# Patient Record
Sex: MALE | Race: ASIAN | NOT HISPANIC OR LATINO | Employment: FULL TIME | URBAN - METROPOLITAN AREA
[De-identification: names, ages, dates, MRNs, and addresses within clinical notes are randomized per-mention and may not be internally consistent; named-entity substitution may affect disease eponyms.]

---

## 2018-02-09 ENCOUNTER — HISTORICAL (OUTPATIENT)
Dept: ADMINISTRATIVE | Facility: HOSPITAL | Age: 58
End: 2018-02-09

## 2018-02-09 LAB
ABS NEUT (OLG): 5.33 X10(3)/MCL (ref 2.1–9.2)
ALBUMIN SERPL-MCNC: 3.6 GM/DL (ref 3.4–5)
ALBUMIN/GLOB SERPL: 1.1 {RATIO}
ALP SERPL-CCNC: 75 UNIT/L (ref 50–136)
ALT SERPL-CCNC: 38 UNIT/L (ref 12–78)
APPEARANCE, UA: CLEAR
AST SERPL-CCNC: 9 UNIT/L (ref 15–37)
BACTERIA #/AREA URNS AUTO: ABNORMAL /HPF
BASOPHILS # BLD AUTO: 0.1 X10(3)/MCL (ref 0–0.2)
BASOPHILS NFR BLD AUTO: 1 %
BILIRUB SERPL-MCNC: 0.3 MG/DL (ref 0.2–1)
BILIRUB UR QL STRIP: NEGATIVE
BILIRUBIN DIRECT+TOT PNL SERPL-MCNC: 0.1 MG/DL (ref 0–0.2)
BILIRUBIN DIRECT+TOT PNL SERPL-MCNC: 0.2 MG/DL (ref 0–0.8)
BUN SERPL-MCNC: 16 MG/DL (ref 7–18)
CALCIUM SERPL-MCNC: 9 MG/DL (ref 8.5–10.1)
CHLORIDE SERPL-SCNC: 106 MMOL/L (ref 98–107)
CHOLEST SERPL-MCNC: 198 MG/DL (ref 0–200)
CHOLEST/HDLC SERPL: 4 {RATIO} (ref 0–5)
CO2 SERPL-SCNC: 27 MMOL/L (ref 21–32)
COLOR UR: YELLOW
CREAT SERPL-MCNC: 1.14 MG/DL (ref 0.7–1.3)
EOSINOPHIL # BLD AUTO: 0.3 X10(3)/MCL (ref 0–0.9)
EOSINOPHIL NFR BLD AUTO: 3 %
ERYTHROCYTE [DISTWIDTH] IN BLOOD BY AUTOMATED COUNT: 13.7 % (ref 11.5–17)
EST. AVERAGE GLUCOSE BLD GHB EST-MCNC: 123 MG/DL
GLOBULIN SER-MCNC: 3.4 GM/DL (ref 2.4–3.5)
GLUCOSE (UA): NEGATIVE
GLUCOSE SERPL-MCNC: 94 MG/DL (ref 74–106)
HBA1C MFR BLD: 5.9 % (ref 4.2–6.3)
HCT VFR BLD AUTO: 44 % (ref 42–52)
HDLC SERPL-MCNC: 49 MG/DL (ref 35–60)
HGB BLD-MCNC: 14.3 GM/DL (ref 14–18)
HGB UR QL STRIP: ABNORMAL
KETONES UR QL STRIP: NEGATIVE
LDLC SERPL CALC-MCNC: 109 MG/DL (ref 0–129)
LEUKOCYTE ESTERASE UR QL STRIP: NEGATIVE
LYMPHOCYTES # BLD AUTO: 2.2 X10(3)/MCL (ref 0.6–4.6)
LYMPHOCYTES NFR BLD AUTO: 26 %
MCH RBC QN AUTO: 26.8 PG (ref 27–31)
MCHC RBC AUTO-ENTMCNC: 32.5 GM/DL (ref 33–36)
MCV RBC AUTO: 82.4 FL (ref 80–94)
MONOCYTES # BLD AUTO: 0.6 X10(3)/MCL (ref 0.1–1.3)
MONOCYTES NFR BLD AUTO: 7 %
NEUTROPHILS # BLD AUTO: 5.33 X10(3)/MCL (ref 2.1–9.2)
NEUTROPHILS NFR BLD AUTO: 63 %
NITRITE UR QL STRIP.AUTO: NEGATIVE
PH UR STRIP: 6 [PH] (ref 5–9)
PLATELET # BLD AUTO: 232 X10(3)/MCL (ref 130–400)
PMV BLD AUTO: 10.5 FL (ref 9.4–12.4)
POTASSIUM SERPL-SCNC: 4.7 MMOL/L (ref 3.5–5.1)
PROT SERPL-MCNC: 7 GM/DL (ref 6.4–8.2)
PROT UR QL STRIP: NEGATIVE
PSA SERPL-MCNC: 7.58 NG/ML (ref 0–4)
RBC # BLD AUTO: 5.34 X10(6)/MCL (ref 4.7–6.1)
RBC #/AREA URNS HPF: ABNORMAL /[HPF]
SODIUM SERPL-SCNC: 141 MMOL/L (ref 136–145)
SP GR UR STRIP: 1.01 (ref 1–1.03)
SQUAMOUS EPITHELIAL, UA: ABNORMAL
TRIGL SERPL-MCNC: 201 MG/DL (ref 30–150)
TSH SERPL-ACNC: 3.19 MIU/ML (ref 0.36–3.74)
UROBILINOGEN UR STRIP-ACNC: 0.2
VLDLC SERPL CALC-MCNC: 40 MG/DL
WBC # SPEC AUTO: 8.5 X10(3)/MCL (ref 4.5–11.5)
WBC #/AREA URNS AUTO: ABNORMAL /HPF (ref 0–3)

## 2018-02-20 ENCOUNTER — HISTORICAL (OUTPATIENT)
Dept: ADMINISTRATIVE | Facility: HOSPITAL | Age: 58
End: 2018-02-20

## 2018-02-20 LAB
ALBUMIN SERPL-MCNC: 3.8 GM/DL (ref 3.4–5)
ALBUMIN/GLOB SERPL: 1.1 RATIO (ref 1.1–2)
ALP SERPL-CCNC: 71 UNIT/L (ref 50–136)
ALT SERPL-CCNC: 39 UNIT/L (ref 12–78)
AST SERPL-CCNC: 11 UNIT/L (ref 15–37)
BILIRUB SERPL-MCNC: 0.6 MG/DL (ref 0.2–1)
BILIRUBIN DIRECT+TOT PNL SERPL-MCNC: 0.1 MG/DL (ref 0–0.5)
BILIRUBIN DIRECT+TOT PNL SERPL-MCNC: 0.5 MG/DL (ref 0–0.8)
BUN SERPL-MCNC: 13 MG/DL (ref 7–18)
CALCIUM SERPL-MCNC: 9.6 MG/DL (ref 8.5–10.1)
CHLORIDE SERPL-SCNC: 106 MMOL/L (ref 98–107)
CO2 SERPL-SCNC: 27 MMOL/L (ref 21–32)
CREAT SERPL-MCNC: 1.1 MG/DL (ref 0.7–1.3)
ERYTHROCYTE [SEDIMENTATION RATE] IN BLOOD: 8 MM/HR (ref 0–15)
GLOBULIN SER-MCNC: 3.5 GM/DL (ref 2.4–3.5)
GLUCOSE SERPL-MCNC: 96 MG/DL (ref 74–106)
POTASSIUM SERPL-SCNC: 4.4 MMOL/L (ref 3.5–5.1)
PROT SERPL-MCNC: 7.3 GM/DL (ref 6.4–8.2)
PSA SERPL-MCNC: 6.9 NG/ML (ref 0–4)
RHEUMATOID FACT SERPL-ACNC: 10 IU/ML (ref 0–15)
SODIUM SERPL-SCNC: 140 MMOL/L (ref 136–145)

## 2018-03-20 ENCOUNTER — HISTORICAL (OUTPATIENT)
Dept: ADMINISTRATIVE | Facility: HOSPITAL | Age: 58
End: 2018-03-20

## 2018-03-20 LAB — PSA SERPL-MCNC: 5.54 NG/ML (ref 0–4)

## 2018-09-19 ENCOUNTER — HISTORICAL (OUTPATIENT)
Dept: LAB | Facility: HOSPITAL | Age: 58
End: 2018-09-19

## 2018-09-19 ENCOUNTER — HISTORICAL (OUTPATIENT)
Dept: ADMINISTRATIVE | Facility: HOSPITAL | Age: 58
End: 2018-09-19

## 2018-09-19 LAB — PSA SERPL-MCNC: 7.58 NG/ML (ref 0–4)

## 2018-09-20 LAB
APPEARANCE, UA: CLEAR
BACTERIA SPEC CULT: ABNORMAL /HPF
BILIRUB UR QL STRIP: NEGATIVE
COLOR UR: YELLOW
GLUCOSE (UA): NEGATIVE
HGB UR QL STRIP: NEGATIVE
KETONES UR QL STRIP: ABNORMAL
LEUKOCYTE ESTERASE UR QL STRIP: NEGATIVE
NITRITE UR QL STRIP: NEGATIVE
PH UR STRIP: 5.5 [PH] (ref 5–9)
PROT UR QL STRIP: NEGATIVE
RBC #/AREA URNS HPF: ABNORMAL /[HPF]
SP GR UR STRIP: 1.02 (ref 1–1.03)
SQUAMOUS EPITHELIAL, UA: ABNORMAL
UROBILINOGEN UR STRIP-ACNC: 0.2
WBC #/AREA URNS HPF: ABNORMAL /HPF

## 2018-09-28 ENCOUNTER — HISTORICAL (OUTPATIENT)
Dept: RADIOLOGY | Facility: HOSPITAL | Age: 58
End: 2018-09-28

## 2018-10-15 ENCOUNTER — HISTORICAL (OUTPATIENT)
Dept: ENDOSCOPY | Facility: HOSPITAL | Age: 58
End: 2018-10-15

## 2018-10-24 ENCOUNTER — HISTORICAL (OUTPATIENT)
Dept: ADMINISTRATIVE | Facility: HOSPITAL | Age: 58
End: 2018-10-24

## 2018-10-24 LAB
ABS NEUT (OLG): 6.09 X10(3)/MCL (ref 2.1–9.2)
ALBUMIN SERPL-MCNC: 4.1 GM/DL (ref 3.4–5)
ALBUMIN/GLOB SERPL: 1.1 {RATIO}
ALP SERPL-CCNC: 78 UNIT/L (ref 50–136)
ALT SERPL-CCNC: 44 UNIT/L (ref 12–78)
AST SERPL-CCNC: 14 UNIT/L (ref 15–37)
BASOPHILS # BLD AUTO: 0.1 X10(3)/MCL (ref 0–0.2)
BASOPHILS NFR BLD AUTO: 1 %
BILIRUB SERPL-MCNC: 0.5 MG/DL (ref 0.2–1)
BILIRUBIN DIRECT+TOT PNL SERPL-MCNC: 0.1 MG/DL (ref 0–0.2)
BILIRUBIN DIRECT+TOT PNL SERPL-MCNC: 0.4 MG/DL (ref 0–0.8)
BUN SERPL-MCNC: 17 MG/DL (ref 7–18)
CALCIUM SERPL-MCNC: 9.7 MG/DL (ref 8.5–10.1)
CHLORIDE SERPL-SCNC: 106 MMOL/L (ref 98–107)
CO2 SERPL-SCNC: 27 MMOL/L (ref 21–32)
CREAT SERPL-MCNC: 1.27 MG/DL (ref 0.7–1.3)
EOSINOPHIL # BLD AUTO: 0.3 X10(3)/MCL (ref 0–0.9)
EOSINOPHIL NFR BLD AUTO: 3 %
ERYTHROCYTE [DISTWIDTH] IN BLOOD BY AUTOMATED COUNT: 13.9 % (ref 11.5–17)
GLOBULIN SER-MCNC: 3.6 GM/DL (ref 2.4–3.5)
GLUCOSE SERPL-MCNC: 96 MG/DL (ref 74–106)
HCT VFR BLD AUTO: 46.5 % (ref 42–52)
HGB BLD-MCNC: 14.6 GM/DL (ref 14–18)
LYMPHOCYTES # BLD AUTO: 2.5 X10(3)/MCL (ref 0.6–4.6)
LYMPHOCYTES NFR BLD AUTO: 25 %
MCH RBC QN AUTO: 26.5 PG (ref 27–31)
MCHC RBC AUTO-ENTMCNC: 31.4 GM/DL (ref 33–36)
MCV RBC AUTO: 84.5 FL (ref 80–94)
MONOCYTES # BLD AUTO: 0.8 X10(3)/MCL (ref 0.1–1.3)
MONOCYTES NFR BLD AUTO: 8 %
NEUTROPHILS # BLD AUTO: 6.09 X10(3)/MCL (ref 2.1–9.2)
NEUTROPHILS NFR BLD AUTO: 62 %
PLATELET # BLD AUTO: 247 X10(3)/MCL (ref 130–400)
PMV BLD AUTO: 10.7 FL (ref 9.4–12.4)
POTASSIUM SERPL-SCNC: 4.5 MMOL/L (ref 3.5–5.1)
PROT SERPL-MCNC: 7.7 GM/DL (ref 6.4–8.2)
RBC # BLD AUTO: 5.5 X10(6)/MCL (ref 4.7–6.1)
SODIUM SERPL-SCNC: 143 MMOL/L (ref 136–145)
WBC # SPEC AUTO: 9.8 X10(3)/MCL (ref 4.5–11.5)

## 2018-12-20 ENCOUNTER — HISTORICAL (OUTPATIENT)
Dept: LAB | Facility: HOSPITAL | Age: 58
End: 2018-12-20

## 2018-12-20 LAB — PSA SERPL-MCNC: 0.01 NG/ML (ref 0–4)

## 2019-04-30 ENCOUNTER — HISTORICAL (OUTPATIENT)
Dept: ADMINISTRATIVE | Facility: HOSPITAL | Age: 59
End: 2019-04-30

## 2019-04-30 LAB — PSA SERPL-MCNC: <0.01 NG/ML (ref 0–4)

## 2019-10-11 ENCOUNTER — HISTORICAL (OUTPATIENT)
Dept: ADMINISTRATIVE | Facility: HOSPITAL | Age: 59
End: 2019-10-11

## 2019-10-11 LAB — PSA SERPL-MCNC: 0.01 NG/ML (ref 0–4)

## 2020-05-01 ENCOUNTER — HISTORICAL (OUTPATIENT)
Dept: ADMINISTRATIVE | Facility: HOSPITAL | Age: 60
End: 2020-05-01

## 2020-05-01 LAB — PSA SERPL-MCNC: <0.02 NG/ML

## 2020-12-07 ENCOUNTER — HISTORICAL (OUTPATIENT)
Dept: ADMINISTRATIVE | Facility: HOSPITAL | Age: 60
End: 2020-12-07

## 2020-12-07 LAB — PSA SERPL-MCNC: <0.02 NG/ML

## 2021-10-05 ENCOUNTER — HISTORICAL (OUTPATIENT)
Dept: ADMINISTRATIVE | Facility: HOSPITAL | Age: 61
End: 2021-10-05

## 2021-10-05 LAB
ALBUMIN SERPL-MCNC: 3.8 GM/DL (ref 3.4–4.8)
ALBUMIN/GLOB SERPL: 1.5 RATIO (ref 1.1–2)
ALP SERPL-CCNC: 76 UNIT/L (ref 40–150)
ALT SERPL-CCNC: 23 UNIT/L (ref 0–55)
AST SERPL-CCNC: 16 UNIT/L (ref 5–34)
BILIRUB SERPL-MCNC: 0.3 MG/DL
BILIRUBIN DIRECT+TOT PNL SERPL-MCNC: 0.1 MG/DL (ref 0–0.8)
BILIRUBIN DIRECT+TOT PNL SERPL-MCNC: 0.2 MG/DL (ref 0–0.5)
BUN SERPL-MCNC: 15.3 MG/DL (ref 8.4–25.7)
CALCIUM SERPL-MCNC: 8.8 MG/DL (ref 8.8–10)
CHLORIDE SERPL-SCNC: 108 MMOL/L (ref 98–107)
CHOLEST SERPL-MCNC: 202 MG/DL
CHOLEST/HDLC SERPL: 4 {RATIO} (ref 0–5)
CO2 SERPL-SCNC: 22 MMOL/L (ref 23–31)
CREAT SERPL-MCNC: 1.06 MG/DL (ref 0.73–1.18)
ERYTHROCYTE [DISTWIDTH] IN BLOOD BY AUTOMATED COUNT: 14.9 % (ref 11.5–17)
GLOBULIN SER-MCNC: 2.5 GM/DL (ref 2.4–3.5)
GLUCOSE SERPL-MCNC: 93 MG/DL (ref 82–115)
HCT VFR BLD AUTO: 40.6 % (ref 42–52)
HDLC SERPL-MCNC: 48 MG/DL (ref 35–60)
HGB BLD-MCNC: 12.7 GM/DL (ref 14–18)
LDLC SERPL CALC-MCNC: 126 MG/DL (ref 50–140)
MCH RBC QN AUTO: 25.7 PG (ref 27–31)
MCHC RBC AUTO-ENTMCNC: 31.3 GM/DL (ref 33–36)
MCV RBC AUTO: 82.2 FL (ref 80–94)
PLATELET # BLD AUTO: 245 X10(3)/MCL (ref 130–400)
PMV BLD AUTO: 10.9 FL (ref 9.4–12.4)
POTASSIUM SERPL-SCNC: 4.2 MMOL/L (ref 3.5–5.1)
PROT SERPL-MCNC: 6.3 GM/DL (ref 5.8–7.6)
PSA SERPL-MCNC: <0.1 NG/ML
RBC # BLD AUTO: 4.94 X10(6)/MCL (ref 4.7–6.1)
SODIUM SERPL-SCNC: 140 MMOL/L (ref 136–145)
TRIGL SERPL-MCNC: 141 MG/DL (ref 34–140)
VLDLC SERPL CALC-MCNC: 28 MG/DL
WBC # SPEC AUTO: 8.2 X10(3)/MCL (ref 4.5–11.5)

## 2021-10-06 ENCOUNTER — HISTORICAL (OUTPATIENT)
Dept: ADMINISTRATIVE | Facility: HOSPITAL | Age: 61
End: 2021-10-06

## 2021-10-06 LAB
FERRITIN SERPL-MCNC: 24.98 NG/ML (ref 21.81–274.66)
IRON SATN MFR SERPL: 20 % (ref 20–50)
IRON SERPL-MCNC: 70 UG/DL (ref 65–175)
TIBC SERPL-MCNC: 274 UG/DL (ref 69–240)
TIBC SERPL-MCNC: 344 UG/DL (ref 250–450)
TRANSFERRIN SERPL-MCNC: 306 MG/DL (ref 174–364)
TSH SERPL-ACNC: 2.3 UIU/ML (ref 0.35–4.94)

## 2021-10-18 ENCOUNTER — HISTORICAL (OUTPATIENT)
Dept: ENDOSCOPY | Facility: HOSPITAL | Age: 61
End: 2021-10-18

## 2022-01-26 ENCOUNTER — HISTORICAL (OUTPATIENT)
Dept: ANESTHESIOLOGY | Facility: HOSPITAL | Age: 62
End: 2022-01-26

## 2022-04-30 NOTE — OP NOTE
DATE OF SURGERY:    10/18/2021    SURGEON:  Mikey Colon MD    REPORT TYPE:  Operative Report and Discharge Summary    PROCEDURES:  Colonoscopy, terminal ileoscopy, snare polypectomy.    PREOPERATIVE DIAGNOSIS:  Personal history of adenomatous colon polyps, mild anemia.    POSTOPERATIVE DIAGNOSES:    1. Adequate sedation with Diprivan per Anesthesia.  2. Normal digital rectal exam.  3. Good colon prep with successful exam to the cecum and a normal appearing terminal ileum.  4. Very rare diverticula, diverticula are 2 noted in the right colon as well.  5. A 4 or 5 mm polyp in the transverse colon at 65 cm, cold snare histopathology pending.  6. A 5 or 6 mm polyp in the ascending colon addressed with a cold snare in a piecemeal fashion and submitted.  7. Otherwise, benign exam.  Appendiceal orifice and ileocecal valve noted.  Terminal ileum normal in appearance.  8. Slow withdrawal found no other discrete mucosal abnormalities.    OPERATION:  Colonoscopy.    PROCEDURE IN DETAIL:  The patient consented for the procedure after a detailed explanation of the risks and complications including bleeding, perforation and adverse reaction to sedation.  The patient was placed in the left lateral decubitus position.  Initially we examined the perineum and performed a digital exam.     The video colonoscope was inserted in the rectal vault and passed under direct vision.  We retroflexed to allow visualization of the anal verge and then de-retroflexed, traversing the sigmoid colon, descending colon and splenic flexure, transverse colon, hepatic flexure, ascending colon and entering the cecum.  The cecum was identified by visualizing the appendiceal orifice and the ileocecal valve.  The scope was withdrawn with reexamination of the mucosa and with biopsies, polypectomies and specimens removed as outlined below.     The scope was removed in its entirety and the patient tolerated the procedure well.     The patient's  findings are as detailed.  He had a good prep, very few diverticula including a couple in the right colon, and a successful exam to the terminal ileum.  Two small polyps were addressed as noted.    DISCUSSION AND DISCHARGE SUMMARY:  After his procedure was completed, we discussed our findings with Dr. Boykin.  We examined him and found him stable.  We allowed that he could resume his usual diet today and activities tomorrow.     Perhaps the baby aspirin was the culprit.  His colon seems reassuring and current recommendations would suggest a repeat exam in 5 years.     If he has more in the way of anemia, then even without dyspeptic issues, an upper exam might be entertained.     He will touch base with us down the line.  He will call if he has not heard today's biopsy results within the week.        ______________________________  Mikey Colon MD    JCB/UK  DD:  10/18/2021  Time:  12:26PM  DT:  10/18/2021  Time:  12:55PM  Job #:  [id number]    cc: MD Gumaro De León MD

## 2022-04-30 NOTE — H&P
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  He carries a personal history of prostate cancer with previous surgery in that regard.  We saw him for screening colonoscopy in October of 2018, and encountered a sigmoid polyp, a descending colon polyp, and 4 small polyps in the ascending colon.  Histopathology returned tubular adenomas in the ascending colon, the sigmoid lesion, a submucosal leiomyoma.  We had proposed a repeat exam in 3 years.     He is today scheduled for that.  He makes note of the fact that he has had a mild diminution in his hemoglobin of late, from 14.7 to 12.6.  In retrospect, he takes a daily aspirin, though without a clear indication.  He has subsequently discontinued the aspirin.  He denies any visible blood loss and any dyspeptic issues.    ALLERGIES:  None.    MEDICATIONS:  Limited to amlodipine and that previous 81 mg aspirin.    PAST MEDICAL HISTORY:  Remarkable for hypertension and the prostate cancer surgery.    SOCIAL HISTORY:   with healthy children.  He does not smoke and does not drink.    FAMILY HISTORY:  Noncontributory.    PHYSICAL EXAM:  VITAL SIGNS:  Afebrile.  Blood pressure 151/87, pulse 78, respirations 17.   HEART:  Regular rate and rhythm.   LUNGS:  Clear to auscultation.   ABDOMEN:  Soft and nontender without discernible organomegaly or mass.   NEUROLOGIC:  Alert and oriented.  Motor grossly intact.      DISCUSSION:  61-year-old with a personal history of adenomatous colon polyps scheduled for surveillance.  Mild anemia.        ______________________________  MD CHUCK De León/UK  DD:  10/18/2021  Time:  12:26PM  DT:  10/18/2021  Time:  12:48PM  Job #:  [id number]    The H&P was reviewed, the patient was examined, and the following changes to the patients condition are  noted:  ______________________________________________________________________________  ______________________________________________________________________________  ______________________________________________________________________________  [  ] No changes to the patient's condition:      ______________________________                                             ___________________  PHYSICIAN SIGNATURE                                                             DATE/TIME    cc: MD Gumaro De León MD

## 2022-04-30 NOTE — OP NOTE
DATE OF SURGERY:    10/15/2018    SURGEON:  Mikey Colon MD    PROCEDURE:  Colonoscopy, terminal ileoscopy, snare and biopsy polypectomy.    PREOPERATIVE DIAGNOSIS:  Screening colonoscopy, personal history of prostate cancer.    POSTOPERATIVE DIAGNOSES:    1. Adequate sedation with Diprivan per Anesthesia.  2. Good colon preparation with successful examination to a normal terminal ileum.  3. 5 mm polyp at 25 cm in the sigmoid colon addressed with cold snare, histopathology pending.  4. 4 mm polyp at 50 cm in the descending colon addressed with cold snare, histopathology pending.  5. Four polyps in the ascending colon measuring 3 and 4 mm addressed with cold snare and biopsy forceps and submitted.    OPERATION:  Colonoscopy.    PROCEDURE IN DETAIL:  The patient consented for the procedure after a detailed explanation of the risks and complications including bleeding, perforation and adverse reaction to sedation.  The patient was placed in the left lateral decubitus position.  Initially we examined the perineum and performed a digital exam.     The video colonoscope was inserted in the rectal vault and passed under direct vision.  We retroflexed to allow visualization of the anal verge and then de-retroflexed, traversing the sigmoid colon, descending colon and splenic flexure, transverse colon, hepatic flexure, ascending colon and entering the cecum.  The cecum was identified by visualizing the appendiceal orifice and the ileocecal valve.  The scope was withdrawn with reexamination of the mucosa and with biopsies, polypectomies and specimens removed as outlined below.     The scope was removed in its entirety and the patient tolerated the procedure well.    FINDINGS:  The patient's findings are as detailed.  He sedated nicely and had a good preparation.  He had a straightforward examination to the cecum and into a normal-appearing terminal ileum.  We noted the appendiceal orifice and ileocecal valve.  On  slow withdrawal, we encountered polyps, 4 diminutive polyps in the ascending colon addressed with cold snare and forceps, and another polyp at 50 cm in the descending colon and an additional polyp at 25 cm in the sigmoid colon.  These were addressed as noted and submitted.    DISCUSSION AND DISCHARGE SUMMARY:  After his procedure was completed we discussed our findings with the patient and his wife.  We examined him and found him stable for discharge.  We allowed that he could resume his usual diet today and activities tomorrow.  He is to call if he has not heard his biopsies within the next week.  If these polyps are adenomatous, he ought to consider a repeat exam in 3-5 years.  Otherwise, he could wait 10 years.        ______________________________  MD CHUCK De León/SR  DD:  10/15/2018  Time:  08:24AM  DT:  10/15/2018  Time:  09:03AM  Job #:  [id number]    cc: MD Gumaro Dejesus MD

## 2022-04-30 NOTE — H&P
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  He denies gastrointestinal symptomatology.  He was recently found to have an elevated PSA, prompting biopsies positive for prostate cancer and he is contemplating treatment in that regard.    PAST MEDICAL HISTORY:  His only past medical history is remarkable for hypertension, well controlled with amlodipine.  He has no surgical.    SOCIAL HISTORY:  Finds that he is  with 3 children.  He does not smoke and really does not drink.  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    FAMILY HISTORY:  Essentially negative for colon, breast, uterine or ovarian malignancy.    PHYSICAL EXAMINATION:  VITAL SIGNS:  Find him afebrile with stable vital signs.     HEART:  Regular in rate and rhythm.     LUNGS:  Clear to auscultation.     ABDOMEN:  Soft and nontender, without discernible organomegaly or mass.     NEUROLOGICAL:  Alert and oriented.  Motor grossly intact.      DISCUSSION:  A 58-year-old newly diagnosed with prostate cancer, here for his initial screening colonoscopy.  It certainly seems prudent to proceed with this at this point in time.  Further recommendations will follow today's study.        ______________________________  MD CHUCK De León/SERENA  DD:  10/15/2018  Time:  07:41AM  DT:  10/15/2018  Time:  07:56AM  Job #:  [id number]    The H&P was reviewed, the patient was examined, and the following changes to the patients condition are noted:  ______________________________________________________________________________  ______________________________________________________________________________  ______________________________________________________________________________  [  ] No changes to the patient's condition:      ______________________________                                             ___________________  PHYSICIAN SIGNATURE                                                              DATE/TIME    cc: MD Gumaro Majano MD

## 2022-10-05 ENCOUNTER — LAB VISIT (OUTPATIENT)
Dept: LAB | Facility: HOSPITAL | Age: 62
End: 2022-10-05
Attending: RADIOLOGY
Payer: COMMERCIAL

## 2022-10-05 DIAGNOSIS — Z12.5 PROSTATE CANCER SCREENING: Primary | ICD-10-CM

## 2022-10-05 LAB — PSA SERPL-MCNC: <0.1 NG/ML

## 2022-10-05 PROCEDURE — 36415 COLL VENOUS BLD VENIPUNCTURE: CPT

## 2022-10-05 PROCEDURE — 84153 ASSAY OF PSA TOTAL: CPT

## 2024-10-31 ENCOUNTER — LAB VISIT (OUTPATIENT)
Dept: LAB | Facility: HOSPITAL | Age: 64
End: 2024-10-31
Attending: RADIOLOGY
Payer: COMMERCIAL

## 2024-10-31 DIAGNOSIS — Z85.46 HISTORY OF PROSTATE CANCER: Primary | ICD-10-CM

## 2024-10-31 LAB — PSA SERPL-MCNC: <0.1 NG/ML

## 2024-10-31 PROCEDURE — 84153 ASSAY OF PSA TOTAL: CPT

## 2024-10-31 PROCEDURE — 36415 COLL VENOUS BLD VENIPUNCTURE: CPT
